# Patient Record
Sex: MALE | URBAN - METROPOLITAN AREA
[De-identification: names, ages, dates, MRNs, and addresses within clinical notes are randomized per-mention and may not be internally consistent; named-entity substitution may affect disease eponyms.]

---

## 2020-04-02 ENCOUNTER — HOSPITAL ENCOUNTER (EMERGENCY)
Facility: HOSPITAL | Age: 80
Discharge: HOME | End: 2020-04-02
Attending: EMERGENCY MEDICINE
Payer: COMMERCIAL

## 2020-04-02 VITALS
BODY MASS INDEX: 30.16 KG/M2 | RESPIRATION RATE: 22 BRPM | DIASTOLIC BLOOD PRESSURE: 81 MMHG | SYSTOLIC BLOOD PRESSURE: 166 MMHG | OXYGEN SATURATION: 98 % | WEIGHT: 235 LBS | HEART RATE: 63 BPM | HEIGHT: 74 IN | TEMPERATURE: 99 F

## 2020-04-02 DIAGNOSIS — R19.7 DIARRHEA, UNSPECIFIED TYPE: Primary | ICD-10-CM

## 2020-04-02 DIAGNOSIS — R06.00 DYSPNEA: ICD-10-CM

## 2020-04-02 DIAGNOSIS — Z20.822 SUSPECTED COVID-19 VIRUS INFECTION: ICD-10-CM

## 2020-04-02 LAB
ALBUMIN SERPL-MCNC: 3.5 G/DL (ref 3.3–5.5)
ALP SERPL-CCNC: 77 U/L (ref 42–141)
BILIRUB SERPL-MCNC: 0.7 MG/DL (ref 0.2–1.6)
BILIRUBIN, POC UA: NEGATIVE
BLOOD, POC UA: NEGATIVE
BUN SERPL-MCNC: 13 MG/DL (ref 7–22)
CALCIUM SERPL-MCNC: 9.5 MG/DL (ref 8–10.3)
CHLORIDE SERPL-SCNC: 105 MMOL/L (ref 98–108)
CLARITY, POC UA: CLEAR
COLOR, POC UA: YELLOW
CREAT SERPL-MCNC: 1.3 MG/DL (ref 0.6–1.2)
CRP SERPL-MCNC: 80.4 MG/L (ref 0–8.2)
CTP QC/QA: YES
FERRITIN SERPL-MCNC: 399 NG/ML (ref 20–300)
GLUCOSE SERPL-MCNC: 147 MG/DL (ref 73–118)
GLUCOSE, POC UA: NEGATIVE
KETONES, POC UA: NEGATIVE
LDH SERPL L TO P-CCNC: 295 U/L (ref 110–260)
LEUKOCYTE EST, POC UA: NEGATIVE
NITRITE, POC UA: NEGATIVE
PH UR STRIP: 5.5 [PH]
POC ALT (SGPT): 41 U/L (ref 10–47)
POC AST (SGOT): 43 U/L (ref 11–38)
POC B-TYPE NATRIURETIC PEPTIDE: 50.5 PG/ML (ref 0–100)
POC CARDIAC TROPONIN I: 0 NG/ML
POC MOLECULAR INFLUENZA A AGN: NEGATIVE
POC MOLECULAR INFLUENZA B AGN: NEGATIVE
POC TCO2: 22 MMOL/L (ref 18–33)
POTASSIUM BLD-SCNC: 4 MMOL/L (ref 3.6–5.1)
PROCALCITONIN SERPL IA-MCNC: 0.04 NG/ML
PROTEIN, POC UA: ABNORMAL
PROTEIN, POC: 7.5 G/DL (ref 6.4–8.1)
SAMPLE: NORMAL
SODIUM BLD-SCNC: 138 MMOL/L (ref 128–145)
SPECIFIC GRAVITY, POC UA: 1.01
UROBILINOGEN, POC UA: 1 E.U./DL

## 2020-04-02 PROCEDURE — 83880 ASSAY OF NATRIURETIC PEPTIDE: CPT | Mod: ER

## 2020-04-02 PROCEDURE — U0002 COVID-19 LAB TEST NON-CDC: HCPCS

## 2020-04-02 PROCEDURE — 99284 EMERGENCY DEPT VISIT MOD MDM: CPT | Mod: 25,ER

## 2020-04-02 PROCEDURE — 82728 ASSAY OF FERRITIN: CPT

## 2020-04-02 PROCEDURE — 93010 EKG 12-LEAD: ICD-10-PCS | Mod: ,,, | Performed by: INTERNAL MEDICINE

## 2020-04-02 PROCEDURE — 85025 COMPLETE CBC W/AUTO DIFF WBC: CPT | Mod: ER

## 2020-04-02 PROCEDURE — 80053 COMPREHEN METABOLIC PANEL: CPT | Mod: ER

## 2020-04-02 PROCEDURE — 63600175 PHARM REV CODE 636 W HCPCS: Mod: ER | Performed by: EMERGENCY MEDICINE

## 2020-04-02 PROCEDURE — 83615 LACTATE (LD) (LDH) ENZYME: CPT

## 2020-04-02 PROCEDURE — 93010 ELECTROCARDIOGRAM REPORT: CPT | Mod: ,,, | Performed by: INTERNAL MEDICINE

## 2020-04-02 PROCEDURE — 86140 C-REACTIVE PROTEIN: CPT

## 2020-04-02 PROCEDURE — 84484 ASSAY OF TROPONIN QUANT: CPT | Mod: ER

## 2020-04-02 PROCEDURE — 63700000 PHARM REV CODE 250 ALT 637 W/O HCPCS: Mod: ER | Performed by: EMERGENCY MEDICINE

## 2020-04-02 PROCEDURE — 84145 PROCALCITONIN (PCT): CPT

## 2020-04-02 PROCEDURE — 87502 INFLUENZA DNA AMP PROBE: CPT | Mod: ER

## 2020-04-02 PROCEDURE — 81003 URINALYSIS AUTO W/O SCOPE: CPT | Mod: ER

## 2020-04-02 PROCEDURE — 93005 ELECTROCARDIOGRAM TRACING: CPT | Mod: ER

## 2020-04-02 PROCEDURE — 96365 THER/PROPH/DIAG IV INF INIT: CPT | Mod: ER

## 2020-04-02 RX ORDER — AZITHROMYCIN 250 MG/1
250 TABLET, FILM COATED ORAL DAILY
Qty: 4 TABLET | Refills: 0 | Status: SHIPPED | OUTPATIENT
Start: 2020-04-02 | End: 2020-04-06

## 2020-04-02 RX ORDER — AZITHROMYCIN 250 MG/1
TABLET, FILM COATED ORAL
Qty: 4 TABLET | Refills: 0 | Status: SHIPPED | OUTPATIENT
Start: 2020-04-02 | End: 2020-04-02

## 2020-04-02 RX ORDER — AZITHROMYCIN 250 MG/1
500 TABLET, FILM COATED ORAL
Status: COMPLETED | OUTPATIENT
Start: 2020-04-02 | End: 2020-04-02

## 2020-04-02 RX ADMIN — CEFTRIAXONE 1 G: 1 INJECTION, SOLUTION INTRAVENOUS at 01:04

## 2020-04-02 RX ADMIN — AZITHROMYCIN 500 MG: 250 TABLET, FILM COATED ORAL at 01:04

## 2020-04-02 NOTE — ED PROVIDER NOTES
Encounter Date: 4/2/2020    SCRIBE #1 NOTE: I, Juliocesar Cramer, am scribing for, and in the presence of,  Dr. Camejo. I have scribed the following portions of the note - the EKG reading. Other sections scribed: HPI, ROS, PE.       History     Chief Complaint   Patient presents with    Fever     pt presents to ED with c/o fever, sob, and diarrhea and has gotten progressively worse. Wife passed away from virus on tues and pt was her caregiver    Diarrhea    Shortness of Breath     Mu Crowley is a 79 y.o. male who presents to the ED complaining of general malaise for 1 week and SOB and non-bloody diarrhea for 2 days.  Patient reports the symptoms are progressively worsening. Patient states his wife passed away from COVID-19 yesterday and he was her caregiver. Denies cough, chest pain, and fevers. Reports still eating but appetite has decreased. Here with daughter who is also being seen. He reports mild sob when walking  But note at rest.     The history is provided by the patient. No  was used.     Review of patient's allergies indicates:   Allergen Reactions    Iodine      No past medical history on file.  No past surgical history on file.  No family history on file.  Social History     Tobacco Use    Smoking status: Not on file   Substance Use Topics    Alcohol use: Not on file    Drug use: Not on file     Review of Systems   Constitutional: Negative for fever.        + general malaise.   Respiratory: Positive for shortness of breath. Negative for cough.    Cardiovascular: Negative for chest pain.   Gastrointestinal: Positive for diarrhea.   All other systems reviewed and are negative.      Physical Exam     Initial Vitals [04/02/20 1129]   BP Pulse Resp Temp SpO2   (!) 142/61 (!) 56 18 98.6 °F (37 °C) 95 %      MAP       --         Physical Exam    Nursing note and vitals reviewed.  Constitutional: He appears well-developed and well-nourished.   HENT:   Head: Normocephalic and atraumatic.    Eyes: Conjunctivae are normal.   Neck: Normal range of motion. Neck supple.   Cardiovascular: Normal rate, regular rhythm, normal heart sounds and intact distal pulses. Exam reveals no gallop and no friction rub.    No murmur heard.  Pulmonary/Chest: Breath sounds normal. No respiratory distress. He has no wheezes. He has no rhonchi. He has no rales.   Abdominal: Soft. Normal appearance.   Musculoskeletal: Normal range of motion.   Neurological: He is alert and oriented to person, place, and time.   Skin: Skin is warm and dry.   Psychiatric: He has a normal mood and affect. His behavior is normal.         ED Course   Procedures  Labs Reviewed   SARS-COV-2 (COVID-19) QUALITATIVE PCR - Abnormal; Notable for the following components:       Result Value    SARS-CoV2 (COVID-19) Qualitative PCR Detected (*)     All other components within normal limits    Narrative:     Is the patient symptomatic?->Yes  What symptom criteria does the patient meet?->Cough   C-REACTIVE PROTEIN - Abnormal; Notable for the following components:    CRP 80.4 (*)     All other components within normal limits   FERRITIN - Abnormal; Notable for the following components:    Ferritin 399 (*)     All other components within normal limits   LACTATE DEHYDROGENASE - Abnormal; Notable for the following components:     (*)     All other components within normal limits   POCT URINALYSIS W/O SCOPE - Abnormal; Notable for the following components:    Protein, UA 1+ (*)     All other components within normal limits   POCT CMP - Abnormal; Notable for the following components:    AST (SGOT), POC 43 (*)     POC Creatinine 1.3 (*)     POC Glucose 147 (*)     All other components within normal limits   PROCALCITONIN   TROPONIN ISTAT   POCT CBC   POCT INFLUENZA A/B MOLECULAR   POCT URINALYSIS W/O SCOPE   POCT CMP   POCT TROPONIN   POCT B-TYPE NATRIURETIC PEPTIDE (BNP)   POCT B-TYPE NATRIURETIC PEPTIDE (BNP)     EKG Readings: (Independently Interpreted)    Initial Reading: No STEMI. Rhythm: Normal Sinus Rhythm. Heart Rate: 68. Ectopy: No Ectopy. Conduction: 1st Degree AV Block.   Left anterior fascicular block.     ECG Results          EKG 12-lead (In process)  Result time 04/02/20 14:57:26    In process by Interface, Lab In [facility] (04/02/20 14:57:26)                 Narrative:    Test Reason : R06.00,    Vent. Rate : 068 BPM     Atrial Rate : 068 BPM     P-R Int : 210 ms          QRS Dur : 154 ms      QT Int : 424 ms       P-R-T Axes : 062 -59 021 degrees     QTc Int : 450 ms    Sinus rhythm with 1st degree A-V block with Premature atrial complexes in a  pattern of bigeminy  Right bundle branch block  Left anterior fascicular block   Bifascicular block   Voltage criteria for left ventricular hypertrophy  Cannot rule out Septal infarct ,age undetermined  Abnormal ECG  No previous ECGs available    Referred By: System System           Confirmed By:                             Imaging Results          X-Ray Chest AP Portable (Final result)  Result time 04/02/20 12:27:13    Final result by Poncho Hendricks MD (04/02/20 12:27:13)                 Impression:      As above.      Electronically signed by: Poncho Hendricks  Date:    04/02/2020  Time:    12:27             Narrative:    EXAMINATION:  XR CHEST AP PORTABLE    CLINICAL HISTORY:  Dyspnea, unspecified    TECHNIQUE:  Single frontal view of the chest was performed.    COMPARISON:  None available    FINDINGS:  Patchy peripheral subsegmental opacity of the right midlung zone most concerning for infectious process in the correct clinical setting.  Overall pattern atypical for edema.  Continued radiographic follow-up upon completion of treatment to ensure resolution.  Some nonspecific blunting of the left costophrenic angle, possible scar/atelectasis.  Otherwise, no large pleural effusion or pneumothorax.  Cardiomediastinal silhouette is within normal limits allowing for technique.  No acute osseous abnormality.                                  Medical Decision Making:   History:   Old Medical Records: I decided to obtain old medical records.  Independently Interpreted Test(s):   I have ordered and independently interpreted EKG Reading(s) - see prior notes  Clinical Tests:   Lab Tests: Ordered and Reviewed  Radiological Study: Ordered and Reviewed  Medical Tests: Ordered and Reviewed  ED Management:  Mr Crowley has been stable during his time in the ER.   Did not require oxygen at any time.   Labs and imaging noted. I have high suspicion of COVID19. He was tested and results are pending. He voiced goo helena. Will be registered in home text covid monitoring program. RA sats with ambulation did not drop and he didn't become tachypneic. He is ambulating at his normal baseline. I spoke with his daughter extensively, with his permission, and we discussed home care and return precautions. While he doesn't require admission at this time, he has a chance for decompensation and she will watch him closely and bring him back for any sob or increased lethargy or new concerns. He is happy w plan. Thye uuncerstand the need for social distancing. There is no indication for further emergent intervention or evaluation at this time.   He was given 500mg zithromax in ed and rocephin, will go home w remainder of zpak course.             Scribe Attestation:   Scribe #1: I performed the above scribed service and the documentation accurately describes the services I performed. I attest to the accuracy of the note.                          Clinical Impression:     1. Diarrhea, unspecified type    2. Dyspnea    3. Suspected Covid-19 Virus Infection                ED Disposition Condition    Discharge Stable        ED Prescriptions     Medication Sig Dispense Start Date End Date Auth. Provider    azithromycin (Z-DONALD) 250 MG tablet  (Status: Discontinued) Take one tablet daily for 4 days. 4 tablet 4/2/2020 4/2/2020 Brady Camejo MD     azithromycin (ZITHROMAX Z-DONALD) 250 MG tablet Take 1 tablet (250 mg total) by mouth once daily. for 4 days 4 tablet 4/2/2020 4/6/2020 Brady Camejo MD        Follow-up Information     Follow up With Specialties Details Why Contact Info    [facility] Emergency Department Emergency Medicine  As needed, If symptoms worsen [address] [facility] [address]  [phone]                                     Brady Camejo MD  04/03/20 1572

## 2020-04-02 NOTE — DISCHARGE INSTRUCTIONS
Rest. Drink plenty of fluids. You have been tested for COVID. You will be called with the results. Return for any increased shortness of breath or if you become very tired, or for any other new concerns.

## 2020-04-03 ENCOUNTER — TELEPHONE (OUTPATIENT)
Dept: EMERGENCY MEDICINE | Facility: HOSPITAL | Age: 80
End: 2020-04-03

## 2020-04-03 LAB — SARS-COV-2 RNA RESP QL NAA+PROBE: DETECTED
